# Patient Record
Sex: FEMALE | Race: WHITE | NOT HISPANIC OR LATINO | Employment: FULL TIME | URBAN - METROPOLITAN AREA
[De-identification: names, ages, dates, MRNs, and addresses within clinical notes are randomized per-mention and may not be internally consistent; named-entity substitution may affect disease eponyms.]

---

## 2018-09-14 ENCOUNTER — HOSPITAL ENCOUNTER (OUTPATIENT)
Dept: GENERAL RADIOLOGY | Facility: HOSPITAL | Age: 48
Discharge: HOME | End: 2018-09-14
Attending: FAMILY MEDICINE | Admitting: FAMILY MEDICINE

## 2018-09-15 ENCOUNTER — CONVERSION ENCOUNTER (OUTPATIENT)
Dept: FAMILY MEDICINE CLINIC | Facility: CLINIC | Age: 48
End: 2018-09-15

## 2018-09-16 LAB
CHOLEST SERPL-MCNC: 178 MG/DL
CHOLEST/HDLC SERPL: 3.6 (CALC)
FSH SERPL-ACNC: 5 MIU/ML
HBA1C MFR BLD: 6.2 %
HDLC SERPL-MCNC: 49 MG/DL
LDLC SERPL CALC-MCNC: 105 MG/DL
LH SERPL-ACNC: 4.2 MIU/ML
NONHDLC SERPL-MCNC: 129 MG/DL
PROLACTIN SERPL-MCNC: 17.3 NG/ML
TRIGL SERPL-MCNC: 143 MG/DL

## 2021-01-07 ENCOUNTER — OFFICE VISIT (OUTPATIENT)
Dept: FAMILY MEDICINE CLINIC | Facility: CLINIC | Age: 51
End: 2021-01-07

## 2021-01-07 VITALS
OXYGEN SATURATION: 95 % | TEMPERATURE: 97.3 F | DIASTOLIC BLOOD PRESSURE: 97 MMHG | SYSTOLIC BLOOD PRESSURE: 160 MMHG | BODY MASS INDEX: 39.03 KG/M2 | HEART RATE: 86 BPM | HEIGHT: 60 IN | WEIGHT: 198.8 LBS

## 2021-01-07 DIAGNOSIS — M25.511 CHRONIC RIGHT SHOULDER PAIN: ICD-10-CM

## 2021-01-07 DIAGNOSIS — Z23 NEED FOR INFLUENZA VACCINATION: ICD-10-CM

## 2021-01-07 DIAGNOSIS — R06.83 SNORING: ICD-10-CM

## 2021-01-07 DIAGNOSIS — Z13.1 SCREENING FOR DIABETES MELLITUS: ICD-10-CM

## 2021-01-07 DIAGNOSIS — Z11.59 NEED FOR HEPATITIS C SCREENING TEST: ICD-10-CM

## 2021-01-07 DIAGNOSIS — E66.01 MORBIDLY OBESE (HCC): ICD-10-CM

## 2021-01-07 DIAGNOSIS — G89.29 CHRONIC RIGHT SHOULDER PAIN: ICD-10-CM

## 2021-01-07 DIAGNOSIS — I10 UNCONTROLLED HYPERTENSION: Primary | ICD-10-CM

## 2021-01-07 DIAGNOSIS — Z13.220 SCREENING, LIPID: ICD-10-CM

## 2021-01-07 PROBLEM — Z88.9 ALLERGIC CONDITION: Status: ACTIVE | Noted: 2021-01-07

## 2021-01-07 PROCEDURE — 99214 OFFICE O/P EST MOD 30 MIN: CPT | Performed by: FAMILY MEDICINE

## 2021-01-07 RX ORDER — LABETALOL 100 MG/1
100 TABLET, FILM COATED ORAL
COMMUNITY
Start: 2020-09-14 | End: 2021-01-07 | Stop reason: SDUPTHER

## 2021-01-07 RX ORDER — METHOCARBAMOL 500 MG/1
TABLET, FILM COATED ORAL
Qty: 60 TABLET | Refills: 0 | Status: SHIPPED | OUTPATIENT
Start: 2021-01-07 | End: 2021-08-13

## 2021-01-07 RX ORDER — MELOXICAM 15 MG/1
15 TABLET ORAL DAILY
Qty: 30 TABLET | Refills: 5 | Status: SHIPPED | OUTPATIENT
Start: 2021-01-07 | End: 2021-08-13

## 2021-01-07 RX ORDER — LABETALOL 100 MG/1
100 TABLET, FILM COATED ORAL 2 TIMES DAILY
Qty: 60 TABLET | Refills: 5 | Status: SHIPPED | OUTPATIENT
Start: 2021-01-07 | End: 2021-08-13 | Stop reason: SDUPTHER

## 2021-01-07 NOTE — PROGRESS NOTES
Chief Complaint   Patient presents with   • Annual Exam     without PAP   • Hypertension   • Shoulder Injury     Patient made an appointment today for a physical exam, but she wants to come in at a later date for the pelvic/pap.  She has additional complaints today.    Hypertension  This is a chronic problem. The current episode started more than 1 year ago. The problem is uncontrolled. (None) Past treatments include beta blockers. Current antihypertension treatment includes nothing. Compliance problems: patient stopped taking her medications.  There is no history of kidney disease, CAD/MI, CVA or heart failure. no known history of PASTOR, renal disease or thyroid disease.   Shoulder Injury   The right shoulder is affected. Incident onset: 5-6 years ago (slipped and fell at work) The injury mechanism was repetitive motion (flaring while working at [employer]). The quality of the pain is described as aching. Pertinent negatives include no numbness (occasional from neck to hand) or tingling. She has tried NSAIDs, acetaminophen and heat for the symptoms. The treatment provided mild relief.     Wellness  Nancy Oliva is a 50 y.o. female here for her annual physical with me. Nancy is here for coordination of medical care, to discuss health maintenance, disease prevention as well as to followup on medical problems. Patient's last CPE was NA. Activity level is moderate. Exercises 0 per week. Appetite is good. Feels well with few complaints. Energy level is good. Sleeps well. Patient's last colonoscopy was N/A. She is advised to repeat in 5 years. Patient's last mammogram was Last year. Patient is doing routine self skin exam monthly. Patient is doing routine self-breast exams monthly.     The following portions of the patient's history were reviewed and updated as appropriate: allergies, current medications, past family history, past medical history, past social history, past surgical history and problem list.      Past  Medical History :  Active Ambulatory Problems     Diagnosis Date Noted   • Hypertension 2021   • Allergic condition 2021   • Snoring 2021   • Morbidly obese (CMS/HCC) 2021   • Chronic right shoulder pain 2021     Resolved Ambulatory Problems     Diagnosis Date Noted   • No Resolved Ambulatory Problems     No Additional Past Medical History       Medication List:    Current Outpatient Medications:   •  labetalol (NORMODYNE) 100 MG tablet, Take 100 mg by mouth. 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   • Sexual activity: Yes       Family History:  Family History   Problem Relation Age of Onset   • Heart disease Father    • Sleep apnea Sister    • Cancer Maternal Aunt    • Cancer Maternal Grandmother    • Diabetes Maternal Grandfather        Past Surgical History:  Past Surgical History:   Procedure Laterality Date   •  SECTION     • TUBAL ABDOMINAL LIGATION         Health Maintenance   Topic Date Due   • COLONOSCOPY  1970   • ANNUAL PHYSICAL  1975   • TDAP/TD VACCINES (1 - Tdap) 1991   • INFLUENZA VACCINE  2020   • HEPATITIS C SCREENING  2021   • PAP SMEAR  2021   • Pneumococcal Vaccine 0-64  Aged Out   • MENINGOCOCCAL VACCINE  Aged Out         Review Of Systems:  Review of Systems   Constitutional: Positive for fatigue. Negative for fever.   Respiratory:        Snores   Musculoskeletal:  "Positive for arthralgias (see HPI).   Neurological: Negative for tingling and numbness (occasional from neck to hand).         Physical Exam:  Vital Signs:  /97   Pulse 86   Temp 97.3 °F (36.3 °C) (Temporal)   Ht 152.4 cm (60\")   Wt 90.2 kg (198 lb 12.8 oz)   LMP 12/19/2020 (Approximate)   SpO2 95%   BMI 38.83 kg/m²     Physical Exam  Constitutional:       General: She is not in acute distress.     Appearance: Normal appearance. She is well-developed. She is obese. She is not ill-appearing.   HENT:      Head: Normocephalic and atraumatic.      Right Ear: External ear normal.      Left Ear: External ear normal.   Eyes:      General: No scleral icterus.        Right eye: No discharge.         Left eye: No discharge.      Conjunctiva/sclera: Conjunctivae normal.      Pupils: Pupils are equal, round, and reactive to light.   Neck:      Musculoskeletal: Normal range of motion and neck supple. No edema or neck rigidity.      Thyroid: No thyromegaly.      Vascular: No JVD.   Cardiovascular:      Rate and Rhythm: Normal rate and regular rhythm.      Heart sounds: Normal heart sounds. No murmur.   Pulmonary:      Effort: Pulmonary effort is normal.      Breath sounds: Normal breath sounds. No wheezing or rales.   Genitourinary:     Comments: Exam not performed - she will return later in the month for exam  Musculoskeletal:         General: Tenderness present. No deformity or signs of injury.      Right shoulder: She exhibits decreased range of motion and tenderness. She exhibits no effusion, no crepitus, no deformity and normal strength.      Right lower leg: No edema.      Left lower leg: No edema.   Skin:     General: Skin is warm.      Capillary Refill: Capillary refill takes less than 2 seconds.      Findings: No rash.   Neurological:      Mental Status: She is alert and oriented to person, place, and time.      Cranial Nerves: No cranial nerve deficit.      Motor: No abnormal muscle tone.      Coordination: " Coordination normal.   Psychiatric:         Mood and Affect: Mood normal.         Behavior: Behavior normal.         Thought Content: Thought content normal.         Judgment: Judgment normal.           Assessment and Plan:  Diagnoses and all orders for this visit:    1. Uncontrolled hypertension (Primary)  Assessment & Plan:  Hypertension is worsening.  Medication changes per orders.  Restart meds.  Blood pressure will be reassessed in 2 weeks at wellness visit.    Orders:  -     labetalol (NORMODYNE) 100 MG tablet; Take 1 tablet by mouth 2 (Two) Times a Day. Patient stopped this medication a few months ago  Dispense: 60 tablet; Refill: 5  -     Comprehensive Metabolic Panel  -     TSH  -     CBC & Differential    2. Snoring  -     Ambulatory Referral to Sleep Lab    3. Chronic right shoulder pain  -     Ambulatory Referral to Physical Therapy Evaluate and treat  -     methocarbamol (Robaxin) 500 MG tablet; 1-2 tablets at bedtime if needed  Dispense: 60 tablet; Refill: 0  -     meloxicam (MOBIC) 15 MG tablet; Take 1 tablet by mouth Daily.  Dispense: 30 tablet; Refill: 5    4. Screening for diabetes mellitus  -     Hemoglobin A1c    5. Screening, lipid  -     Lipid Panel    6. Need for hepatitis C screening test  -     Hepatitis C antibody    7. Morbidly obese (CMS/HCC)    She will return on 1/20/2021 for an annual exam, recheck of her shoulder and blood pressure and to discuss lab results.  Lab order given to patient for labs to be done prior to next visit.    I wore protective equipment throughout this patient encounter to include mask and eye protection. Hand hygiene was performed before donning protective equipment and after removal when leaving the room.  Patient also wore a mask.

## 2021-01-20 ENCOUNTER — TELEPHONE (OUTPATIENT)
Dept: FAMILY MEDICINE CLINIC | Facility: CLINIC | Age: 51
End: 2021-01-20

## 2021-01-25 ENCOUNTER — TELEPHONE (OUTPATIENT)
Dept: FAMILY MEDICINE CLINIC | Facility: CLINIC | Age: 51
End: 2021-01-25

## 2021-01-25 PROBLEM — M25.511 CHRONIC RIGHT SHOULDER PAIN: Status: ACTIVE | Noted: 2021-01-25

## 2021-01-25 PROBLEM — E66.01 MORBIDLY OBESE (HCC): Status: ACTIVE | Noted: 2021-01-25

## 2021-01-25 PROBLEM — G89.29 CHRONIC RIGHT SHOULDER PAIN: Status: ACTIVE | Noted: 2021-01-25

## 2021-01-25 PROCEDURE — 90686 IIV4 VACC NO PRSV 0.5 ML IM: CPT | Performed by: FAMILY MEDICINE

## 2021-01-25 PROCEDURE — 90471 IMMUNIZATION ADMIN: CPT | Performed by: FAMILY MEDICINE

## 2021-01-25 NOTE — TELEPHONE ENCOUNTER
----- Message from Elham Odell MD sent at 1/25/2021  6:00 AM EST -----  Regarding: F/U  Can you reach out to patient to see if she is still interested in proceeding with PT for her shoulder injury and a sleep study.  They have been trying to contact her and havr been unsuccessful.    If you are not able to get ahold of her, let me know and I'll send a letter.    She also needs to reschedule her annual exam.

## 2021-01-25 NOTE — TELEPHONE ENCOUNTER
LM advising pt [facility] has been trying to reach her to schedule sleep study and to reschedule annual exam with Dr. Odell.  Pt is scheduled at PT for 02/04/2021

## 2021-01-25 NOTE — ASSESSMENT & PLAN NOTE
Hypertension is worsening.  Medication changes per orders.  Restart meds.  Blood pressure will be reassessed in 2 weeks at wellness visit.

## 2021-01-29 ENCOUNTER — OFFICE VISIT (OUTPATIENT)
Dept: FAMILY MEDICINE CLINIC | Facility: CLINIC | Age: 51
End: 2021-01-29

## 2021-01-29 VITALS
HEART RATE: 84 BPM | WEIGHT: 198 LBS | SYSTOLIC BLOOD PRESSURE: 144 MMHG | DIASTOLIC BLOOD PRESSURE: 94 MMHG | HEIGHT: 60 IN | OXYGEN SATURATION: 98 % | BODY MASS INDEX: 38.87 KG/M2 | RESPIRATION RATE: 16 BRPM | TEMPERATURE: 97.3 F

## 2021-01-29 DIAGNOSIS — Z12.4 SCREENING FOR CERVICAL CANCER: ICD-10-CM

## 2021-01-29 DIAGNOSIS — I10 ESSENTIAL HYPERTENSION: ICD-10-CM

## 2021-01-29 DIAGNOSIS — Z00.00 ENCOUNTER FOR ANNUAL PHYSICAL EXAM: Primary | ICD-10-CM

## 2021-01-29 DIAGNOSIS — Z12.11 COLON CANCER SCREENING: ICD-10-CM

## 2021-01-29 DIAGNOSIS — E66.01 MORBIDLY OBESE (HCC): ICD-10-CM

## 2021-01-29 DIAGNOSIS — Z12.31 ENCOUNTER FOR SCREENING MAMMOGRAM FOR MALIGNANT NEOPLASM OF BREAST: ICD-10-CM

## 2021-01-29 DIAGNOSIS — E78.2 MIXED HYPERLIPIDEMIA: ICD-10-CM

## 2021-01-29 DIAGNOSIS — R01.1 HEART MURMUR: ICD-10-CM

## 2021-01-29 DIAGNOSIS — E74.39 GLUCOSE INTOLERANCE: ICD-10-CM

## 2021-01-29 LAB
BILIRUB BLD-MCNC: NEGATIVE MG/DL
CLARITY, POC: CLEAR
COLOR UR: YELLOW
GLUCOSE UR STRIP-MCNC: NEGATIVE MG/DL
KETONES UR QL: NEGATIVE
LEUKOCYTE EST, POC: NEGATIVE
NITRITE UR-MCNC: NEGATIVE MG/ML
PH UR: 6 [PH] (ref 5–8)
PROT UR STRIP-MCNC: ABNORMAL MG/DL
RBC # UR STRIP: ABNORMAL /UL
SP GR UR: 1.02 (ref 1–1.03)
UROBILINOGEN UR QL: NORMAL

## 2021-01-29 PROCEDURE — 99396 PREV VISIT EST AGE 40-64: CPT | Performed by: FAMILY MEDICINE

## 2021-01-29 PROCEDURE — 81003 URINALYSIS AUTO W/O SCOPE: CPT | Performed by: FAMILY MEDICINE

## 2021-01-30 LAB
ALBUMIN SERPL-MCNC: 4.1 G/DL (ref 3.8–4.8)
ALBUMIN/GLOB SERPL: 1.4 {RATIO} (ref 1.2–2.2)
ALP SERPL-CCNC: 109 IU/L (ref 39–117)
ALT SERPL-CCNC: 23 IU/L (ref 0–32)
AST SERPL-CCNC: 24 IU/L (ref 0–40)
BASOPHILS # BLD AUTO: 0 X10E3/UL (ref 0–0.2)
BASOPHILS NFR BLD AUTO: 1 %
BILIRUB SERPL-MCNC: <0.2 MG/DL (ref 0–1.2)
BUN SERPL-MCNC: 8 MG/DL (ref 6–24)
BUN/CREAT SERPL: 11 (ref 9–23)
CALCIUM SERPL-MCNC: 9.6 MG/DL (ref 8.7–10.2)
CHLORIDE SERPL-SCNC: 103 MMOL/L (ref 96–106)
CHOLEST SERPL-MCNC: 194 MG/DL (ref 100–199)
CO2 SERPL-SCNC: 23 MMOL/L (ref 20–29)
CREAT SERPL-MCNC: 0.72 MG/DL (ref 0.57–1)
EOSINOPHIL # BLD AUTO: 0.2 X10E3/UL (ref 0–0.4)
EOSINOPHIL NFR BLD AUTO: 2 %
ERYTHROCYTE [DISTWIDTH] IN BLOOD BY AUTOMATED COUNT: 14 % (ref 11.7–15.4)
GLOBULIN SER CALC-MCNC: 3 G/DL (ref 1.5–4.5)
GLUCOSE SERPL-MCNC: 108 MG/DL (ref 65–99)
HBA1C MFR BLD: 6.9 % (ref 4.8–5.6)
HCT VFR BLD AUTO: 37.2 % (ref 34–46.6)
HCV AB S/CO SERPL IA: <0.1 S/CO RATIO (ref 0–0.9)
HDLC SERPL-MCNC: 44 MG/DL
HGB BLD-MCNC: 11.9 G/DL (ref 11.1–15.9)
IMM GRANULOCYTES # BLD AUTO: 0 X10E3/UL (ref 0–0.1)
IMM GRANULOCYTES NFR BLD AUTO: 0 %
LDLC SERPL CALC-MCNC: 113 MG/DL (ref 0–99)
LYMPHOCYTES # BLD AUTO: 2.1 X10E3/UL (ref 0.7–3.1)
LYMPHOCYTES NFR BLD AUTO: 27 %
MCH RBC QN AUTO: 25.9 PG (ref 26.6–33)
MCHC RBC AUTO-ENTMCNC: 32 G/DL (ref 31.5–35.7)
MCV RBC AUTO: 81 FL (ref 79–97)
MONOCYTES # BLD AUTO: 0.5 X10E3/UL (ref 0.1–0.9)
MONOCYTES NFR BLD AUTO: 6 %
NEUTROPHILS # BLD AUTO: 5.2 X10E3/UL (ref 1.4–7)
NEUTROPHILS NFR BLD AUTO: 64 %
PLATELET # BLD AUTO: 278 X10E3/UL (ref 150–450)
POTASSIUM SERPL-SCNC: 4.4 MMOL/L (ref 3.5–5.2)
PROT SERPL-MCNC: 7.1 G/DL (ref 6–8.5)
RBC # BLD AUTO: 4.59 X10E6/UL (ref 3.77–5.28)
SODIUM SERPL-SCNC: 139 MMOL/L (ref 134–144)
TRIGL SERPL-MCNC: 211 MG/DL (ref 0–149)
TSH SERPL DL<=0.005 MIU/L-ACNC: 2.58 UIU/ML (ref 0.45–4.5)
VLDLC SERPL CALC-MCNC: 37 MG/DL (ref 5–40)
WBC # BLD AUTO: 8 X10E3/UL (ref 3.4–10.8)

## 2021-02-02 DIAGNOSIS — E74.39 GLUCOSE INTOLERANCE: ICD-10-CM

## 2021-02-02 DIAGNOSIS — E78.2 MIXED HYPERLIPIDEMIA: Primary | ICD-10-CM

## 2021-02-02 PROBLEM — E11.9 CONTROLLED TYPE 2 DIABETES MELLITUS WITHOUT COMPLICATION, WITHOUT LONG-TERM CURRENT USE OF INSULIN (HCC): Status: ACTIVE | Noted: 2021-02-02

## 2021-02-02 PROBLEM — E11.9 CONTROLLED TYPE 2 DIABETES MELLITUS WITHOUT COMPLICATION, WITHOUT LONG-TERM CURRENT USE OF INSULIN (HCC): Status: RESOLVED | Noted: 2021-02-02 | Resolved: 2021-02-02

## 2021-02-02 RX ORDER — PRAVASTATIN SODIUM 20 MG
20 TABLET ORAL DAILY
Qty: 30 TABLET | Refills: 5 | Status: SHIPPED | OUTPATIENT
Start: 2021-02-02 | End: 2021-08-13 | Stop reason: SDUPTHER

## 2021-02-02 RX ORDER — METFORMIN HYDROCHLORIDE 500 MG/1
500 TABLET, EXTENDED RELEASE ORAL 2 TIMES DAILY WITH MEALS
Qty: 60 TABLET | Refills: 5 | Status: SHIPPED | OUTPATIENT
Start: 2021-02-02 | End: 2021-08-13 | Stop reason: SDUPTHER

## 2021-02-04 ENCOUNTER — TELEPHONE (OUTPATIENT)
Dept: FAMILY MEDICINE CLINIC | Facility: CLINIC | Age: 51
End: 2021-02-04

## 2021-02-04 ENCOUNTER — APPOINTMENT (OUTPATIENT)
Dept: CARDIOLOGY | Facility: HOSPITAL | Age: 51
End: 2021-02-04

## 2021-02-04 ENCOUNTER — TREATMENT (OUTPATIENT)
Dept: PHYSICAL THERAPY | Facility: CLINIC | Age: 51
End: 2021-02-04

## 2021-02-04 DIAGNOSIS — G89.29 CHRONIC RIGHT SHOULDER PAIN: Primary | ICD-10-CM

## 2021-02-04 DIAGNOSIS — M25.511 CHRONIC RIGHT SHOULDER PAIN: Primary | ICD-10-CM

## 2021-02-04 DIAGNOSIS — R29.3 POOR POSTURE: ICD-10-CM

## 2021-02-04 LAB
CYTOLOGIST CVX/VAG CYTO: NORMAL
CYTOLOGY CVX/VAG DOC CYTO: NORMAL
DX ICD CODE: NORMAL
HIV 1 & 2 AB SER-IMP: NORMAL
OTHER STN SPEC: NORMAL
STAT OF ADQ CVX/VAG CYTO-IMP: NORMAL

## 2021-02-04 PROCEDURE — 97162 PT EVAL MOD COMPLEX 30 MIN: CPT | Performed by: PHYSICAL THERAPIST

## 2021-02-04 PROCEDURE — 97110 THERAPEUTIC EXERCISES: CPT | Performed by: PHYSICAL THERAPIST

## 2021-02-04 PROCEDURE — 97014 ELECTRIC STIMULATION THERAPY: CPT | Performed by: PHYSICAL THERAPIST

## 2021-02-04 NOTE — PROGRESS NOTES
Physical Therapy Initial Evaluation and Plan of Care    Patient: Nancy Oliva   : 1970  Diagnosis/ICD-10 Code:  Chronic right shoulder pain [M25.511, G89.29]  Referring practitioner: Elham Adames*  Date of Initial Visit: 2021  Today's Date: 2021  Patient seen for 1 sessions           Subjective Questionnaire: QuickDASH: 39%      Subjective Evaluation    History of Present Illness  Mechanism of injury: Patient is a 50 year old female with complaints of R shoulder pain.  She reports falling onto her R shoulder 5 years ago and has experienced intermittent pain since.  In September, she started working at [employer] and began experiencing anterior shoulder pain, R upper trap and medial scapular pain.  Patient reports her shoulder is okay the first work day of the week but then is very sore the remainder of the work week.  Patient reports difficulty with work duties, numbness in L hand since she has been relying on her LUE for work duties, and difficulty sleeping.        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  Current pain ratin  At worst pain ratin  Location: R upper trap, medial scapula  Quality: dull ache (sore)  Relieving factors: rest and medications  Aggravating factors: lifting, outstretched reach, sleeping and prolonged positioning    Social Support  Lives with: adult children    Hand dominance: right    Patient Goals  Patient goals for therapy: increased strength, decreased pain, increased motion and independence with ADLs/IADLs             Objective          Postural Observations    Additional Postural Observation Details  R shoulder depressed, B scapulae protracted; forward head, increased thoracic kyphosis; Dowager's hump    Palpation     Right   No palpable tenderness to the anterior deltoid, biceps, lower trapezius, middle deltoid and middle trapezius. Tenderness of the infraspinatus, levator scapulae, supraspinatus and upper trapezius.     Tenderness      Right Shoulder  Tenderness in the infraspinatus tendon and supraspinatus tendon. No tenderness in the bicipital groove.     Cervical/Thoracic Screen   Cervical range of motion within normal limits with the following exceptions: Cervical ROM WFL, non-provocative    Active Range of Motion   Left Shoulder   Flexion: WFL  Abduction: WFL  External rotation BTH: WFL  Internal rotation BTB: WFL    Right Shoulder   Flexion: WFL and with pain  Abduction: WFL and with pain  External rotation BTH: WFL and with pain  Internal rotation BTB: WFL and with pain    Strength/Myotome Testing     Left Shoulder     Planes of Motion   Flexion: 4+   Abduction: 4+   External rotation at 0°: 4+   Internal rotation at 0°: 5     Right Shoulder     Planes of Motion   Flexion: 4   Abduction: 4   External rotation at 0°: 4-   Internal rotation at 0°: 5     Isolated Muscles   Infraspinatus: 4-     Tests     Left Shoulder   Negative belly press, crank, empty can, full can, Hawkin's, lift-off and painful arc.     Right Shoulder   Positive empty can.   Negative belly press, crank, full can, Hawkin's, lift-off and painful arc.           Assessment & Plan     Assessment  Impairments: abnormal or restricted ROM, activity intolerance, impaired physical strength, lacks appropriate home exercise program and pain with function  Assessment details: Patient is a 50 year old female with complaints of R shoulder pain.  Patient presents with painful shoulder ROM, weakness of supraspinatus and infraspinatus, tenderness of upper trap, medial scapula, and rotation cuff musculature, and difficulty sleeping and performing work duties.  Patient presents with the impairments listed above and based on the objective findings and the physical therapy evaluation, the patient's condition has the potential to improve in response to therapy.   The patient's condition and/or services required are at a level of complexity that necessitates the skill & supervision of a  physical therapist.    Prognosis: good  Functional Limitations: carrying objects, lifting, sleeping, uncomfortable because of pain, reaching behind back and reaching overhead  Goals  Plan Goals: STG:  -Patient will report a reduction in R shoulder/upper trap pain by >/=25% for improved tolerance to ADLs in 2 weeks.  -Patient will require less than 3 cues for appropriate performance of HEP in 2 weeks.   -Patient will demonstrate improved posture with no verbal cueing in 2 weeks.    LTG:  -Patient will demonstrate shoulder AROM to WFL without complaints of pain by discharge.  -Patient will demonstrate increased strength of R shoulder to grossly 4+/5 by discharge.  -Patient will be independent with HEP by discharge.  -Patient will report min to no R shoulder pain for return to PLOF by discharge.    Plan  Therapy options: will be seen for skilled physical therapy services  Planned modality interventions: cryotherapy, thermotherapy (hydrocollator packs), electrical stimulation/Thai stimulation, ultrasound and dry needling  Planned therapy interventions: manual therapy, postural training, soft tissue mobilization, spinal/joint mobilization, strengthening, stretching, therapeutic activities, joint mobilization, home exercise program, functional ROM exercises, flexibility and body mechanics training  Frequency: 2x week  Duration in visits: 16  Treatment plan discussed with: patient        Timed:         Manual Therapy:         mins  64509;     Therapeutic Exercise:    14     mins  39777;     Neuromuscular Mirta:        mins  05715;    Therapeutic Activity:          mins  60289;     Gait Training:           mins  37701;     Ultrasound:          mins  75455;    Ionto                                   mins   52220  Self-care  ____ mins 67014    Un-Timed:  Electrical Stimulation:    15     mins  13274 ( );  Dry Needling          mins self-pay  Traction          mins 47520  Low Eval          Mins  82496  Mod Eval     27      Mins  74939  High Eval                            Mins  88867        Timed Treatment:   14   mins   Total Treatment:     56   mins    PT SIGNATURE: Faviola Machado PT   [place] License # [id number]  Physical Therapist  DATE TREATMENT INITIATED: 2/4/2021    Initial Certification  Certification Period: 5/5/2021  I certify that the therapy services are furnished while this patient is under my care.  The services outlined above are required by this patient, and will be reviewed every 90 days.     PHYSICIAN: Elham Odell MD      DATE:     Please sign and return via fax to [phone].. Thank you, [facility].

## 2021-02-04 NOTE — TELEPHONE ENCOUNTER
Called and spoke to Nancy regarding pap smear results. Also discussed lab results. She expressed understanding about new medications and stated she would pick these up tomorrow. She is scheduled for 04/30/2021 for a follow up.  Letter was mailed to patient with appt information per patient request.        ----- Message from Elham Odell MD sent at 2/4/2021  8:29 AM EST -----  Can you please let patient know that her pap smear is negative.   Her next will be due in 3 years.      Can you please let patient know test results:     Her lab work is showing elevated blood sugar readings consistent with diabetes and high cholesterol.  I would like to start her on metformin to help regulate her blood sugar readings.  I will send in metformin  mg twice daily to take with meals.  Have her watch for GI discomfort and diarrhea.     I would also like her to start a low dosage statin for her cholesterol.  I will send in pravastatin 20 mg daily.  I recommend a f/u in the office in 3 mo to recheck her a1c and cholesterol.  Meds sent to pharmacy on file.     The rest of her labs look good.  I will also type up a letter that we can drop in the mail to her so that she can review the numbers herself.

## 2021-02-04 NOTE — TELEPHONE ENCOUNTER
----- Message from Elham Odell MD sent at 2/4/2021  8:29 AM EST -----  Can you please let patient know that her pap smear is negative.   Her next will be due in 3 years.

## 2021-02-05 ENCOUNTER — HOSPITAL ENCOUNTER (OUTPATIENT)
Dept: CARDIOLOGY | Facility: HOSPITAL | Age: 51
Discharge: HOME | End: 2021-02-05
Admitting: FAMILY MEDICINE

## 2021-02-05 VITALS
HEIGHT: 60 IN | DIASTOLIC BLOOD PRESSURE: 96 MMHG | BODY MASS INDEX: 38.87 KG/M2 | WEIGHT: 197.97 LBS | SYSTOLIC BLOOD PRESSURE: 208 MMHG

## 2021-02-05 DIAGNOSIS — R01.1 HEART MURMUR: ICD-10-CM

## 2021-02-05 PROCEDURE — 93306 TTE W/DOPPLER COMPLETE: CPT

## 2021-02-05 PROCEDURE — 93306 TTE W/DOPPLER COMPLETE: CPT | Performed by: INTERNAL MEDICINE

## 2021-02-09 LAB
BH CV ECHO MEAS - ACS: 1.8 CM
BH CV ECHO MEAS - AI DEC SLOPE: 231.8 CM/SEC^2
BH CV ECHO MEAS - AI DEC TIME: 1.9 SEC
BH CV ECHO MEAS - AI MAX PG: 69.4 MMHG
BH CV ECHO MEAS - AI MAX VEL: 416.6 CM/SEC
BH CV ECHO MEAS - AI P1/2T: 526.3 MSEC
BH CV ECHO MEAS - AO MAX PG (FULL): 9.6 MMHG
BH CV ECHO MEAS - AO MAX PG: 17.7 MMHG
BH CV ECHO MEAS - AO MEAN PG (FULL): 3.3 MMHG
BH CV ECHO MEAS - AO MEAN PG: 7.2 MMHG
BH CV ECHO MEAS - AO ROOT AREA (BSA CORRECTED): 1.6
BH CV ECHO MEAS - AO ROOT AREA: 6.8 CM^2
BH CV ECHO MEAS - AO ROOT DIAM: 2.9 CM
BH CV ECHO MEAS - AO V2 MAX: 210.3 CM/SEC
BH CV ECHO MEAS - AO V2 MEAN: 123 CM/SEC
BH CV ECHO MEAS - AO V2 VTI: 41.8 CM
BH CV ECHO MEAS - ASC AORTA: 2.7 CM
BH CV ECHO MEAS - AVA(I,A): 2.5 CM^2
BH CV ECHO MEAS - AVA(I,D): 2.5 CM^2
BH CV ECHO MEAS - AVA(V,A): 2.1 CM^2
BH CV ECHO MEAS - AVA(V,D): 2.1 CM^2
BH CV ECHO MEAS - BSA(HAYCOCK): 2 M^2
BH CV ECHO MEAS - BSA: 1.9 M^2
BH CV ECHO MEAS - BZI_BMI: 38.7 KILOGRAMS/M^2
BH CV ECHO MEAS - BZI_METRIC_HEIGHT: 152.4 CM
BH CV ECHO MEAS - BZI_METRIC_WEIGHT: 89.8 KG
BH CV ECHO MEAS - EDV(CUBED): 145.9 ML
BH CV ECHO MEAS - EDV(MOD-SP4): 100.5 ML
BH CV ECHO MEAS - EDV(TEICH): 133.2 ML
BH CV ECHO MEAS - EF(CUBED): 72.1 %
BH CV ECHO MEAS - EF(MOD-BP): 65 %
BH CV ECHO MEAS - EF(MOD-SP4): 67.9 %
BH CV ECHO MEAS - EF(TEICH): 63.4 %
BH CV ECHO MEAS - ESV(CUBED): 40.6 ML
BH CV ECHO MEAS - ESV(MOD-SP4): 32.3 ML
BH CV ECHO MEAS - ESV(TEICH): 48.7 ML
BH CV ECHO MEAS - FS: 34.7 %
BH CV ECHO MEAS - IVS/LVPW: 1.1
BH CV ECHO MEAS - IVSD: 1.1 CM
BH CV ECHO MEAS - LA DIMENSION: 3.2 CM
BH CV ECHO MEAS - LA/AO: 1.1
BH CV ECHO MEAS - LV DIASTOLIC VOL/BSA (35-75): 54.1 ML/M^2
BH CV ECHO MEAS - LV MASS(C)D: 224.1 GRAMS
BH CV ECHO MEAS - LV MASS(C)DI: 120.5 GRAMS/M^2
BH CV ECHO MEAS - LV MAX PG: 8.1 MMHG
BH CV ECHO MEAS - LV MEAN PG: 4 MMHG
BH CV ECHO MEAS - LV SYSTOLIC VOL/BSA (12-30): 17.4 ML/M^2
BH CV ECHO MEAS - LV V1 MAX: 142.3 CM/SEC
BH CV ECHO MEAS - LV V1 MEAN: 92.6 CM/SEC
BH CV ECHO MEAS - LV V1 VTI: 33.8 CM
BH CV ECHO MEAS - LVIDD: 5.3 CM
BH CV ECHO MEAS - LVIDS: 3.4 CM
BH CV ECHO MEAS - LVOT AREA: 3.1 CM^2
BH CV ECHO MEAS - LVOT DIAM: 2 CM
BH CV ECHO MEAS - LVPWD: 1.1 CM
BH CV ECHO MEAS - MR MAX PG: 124.4 MMHG
BH CV ECHO MEAS - MR MAX VEL: 557.4 CM/SEC
BH CV ECHO MEAS - MV A MAX VEL: 101.7 CM/SEC
BH CV ECHO MEAS - MV DEC SLOPE: 464.7 CM/SEC^2
BH CV ECHO MEAS - MV DEC TIME: 0.26 SEC
BH CV ECHO MEAS - MV E MAX VEL: 123.1 CM/SEC
BH CV ECHO MEAS - MV E/A: 1.2
BH CV ECHO MEAS - MV MAX PG: 6.7 MMHG
BH CV ECHO MEAS - MV MEAN PG: 2.6 MMHG
BH CV ECHO MEAS - MV V2 MAX: 129.1 CM/SEC
BH CV ECHO MEAS - MV V2 MEAN: 74.4 CM/SEC
BH CV ECHO MEAS - MV V2 VTI: 43.5 CM
BH CV ECHO MEAS - MVA(VTI): 2.4 CM^2
BH CV ECHO MEAS - PA ACC TIME: 0.09 SEC
BH CV ECHO MEAS - PA MAX PG: 10.7 MMHG
BH CV ECHO MEAS - PA PR(ACCEL): 36.6 MMHG
BH CV ECHO MEAS - PA V2 MAX: 163.6 CM/SEC
BH CV ECHO MEAS - PI END-D VEL: 201 CM/SEC
BH CV ECHO MEAS - PULM A REVS DUR: 0.13 SEC
BH CV ECHO MEAS - PULM A REVS VEL: 36.5 CM/SEC
BH CV ECHO MEAS - PULM DIAS VEL: 43 CM/SEC
BH CV ECHO MEAS - PULM S/D: 1.8
BH CV ECHO MEAS - PULM SYS VEL: 76.7 CM/SEC
BH CV ECHO MEAS - RAP SYSTOLE: 10 MMHG
BH CV ECHO MEAS - RVSP: 56.5 MMHG
BH CV ECHO MEAS - SI(AO): 153.2 ML/M^2
BH CV ECHO MEAS - SI(CUBED): 56.6 ML/M^2
BH CV ECHO MEAS - SI(LVOT): 55.8 ML/M^2
BH CV ECHO MEAS - SI(MOD-SP4): 36.7 ML/M^2
BH CV ECHO MEAS - SI(TEICH): 45.5 ML/M^2
BH CV ECHO MEAS - SV(AO): 284.8 ML
BH CV ECHO MEAS - SV(CUBED): 105.2 ML
BH CV ECHO MEAS - SV(LVOT): 103.7 ML
BH CV ECHO MEAS - SV(MOD-SP4): 68.2 ML
BH CV ECHO MEAS - SV(TEICH): 84.5 ML
BH CV ECHO MEAS - TR MAX VEL: 324.3 CM/SEC
BH CV XLRA - RV BASE: 3.2 CM
BH CV XLRA - RV MID: 2.4 CM

## 2021-02-10 NOTE — PROGRESS NOTES
Please let patient know the results of her echo.    1) heart function is normal.     2) She has one leaky valve that does not require any treatment at this time.      3) The heart muscle of the left ventricle (that pumps blood to the body) has thickened.  So, it means the heart is having to pump harder.  She needs to achieve very good blood pressure control.  I recommend she closely watch her BP.  Keep readings <135/85.  Watch diet (recommend DASH diet), exercise and lose weight.  And, follow-through with the sleep apnea evaluation, to make sure that is not a factor in these changes.

## 2021-02-17 ENCOUNTER — TELEPHONE (OUTPATIENT)
Dept: FAMILY MEDICINE CLINIC | Facility: CLINIC | Age: 51
End: 2021-02-17

## 2021-02-17 NOTE — TELEPHONE ENCOUNTER
----- Message from Elham Odell MD sent at 2/10/2021 10:00 AM EST -----  Please let patient know the results of her echo.    1) heart function is normal.     2) She has one leaky valve that does not require any treatment at this time.      3) The heart muscle of the left ventricle (that pumps blood to the body) has thickened.  So, it means the heart is having to pump harder.  She needs to achieve very good blood pressure control.  I recommend she closely watch her BP.  Keep readings <1  35/85.  Watch diet (recommend DASH diet), exercise and lose weight.  And, follow-through with the sleep apnea evaluation, to make sure that is not a factor in these changes.

## 2021-02-17 NOTE — TELEPHONE ENCOUNTER
Spoke to pt 02/17/2021, 5:25pm advised pt of ECHO results, monitor BP, exercise and lose weight per Dr. Odell.      Pt states Metformin has been giving her diarrhea on a daily basis, can she take another medication?

## 2021-02-18 NOTE — TELEPHONE ENCOUNTER
Have her try OTC Berberine 500 mg twice daily.  It is a herbal treatment that has shown to about as effective as metformin.  Do this along with dietary reduction of carbs.

## 2021-02-19 ENCOUNTER — TREATMENT (OUTPATIENT)
Dept: PHYSICAL THERAPY | Facility: CLINIC | Age: 51
End: 2021-02-19

## 2021-02-19 DIAGNOSIS — G89.29 CHRONIC RIGHT SHOULDER PAIN: Primary | ICD-10-CM

## 2021-02-19 DIAGNOSIS — M25.511 CHRONIC RIGHT SHOULDER PAIN: Primary | ICD-10-CM

## 2021-02-19 DIAGNOSIS — R29.3 POOR POSTURE: ICD-10-CM

## 2021-02-19 PROCEDURE — 97014 ELECTRIC STIMULATION THERAPY: CPT | Performed by: PHYSICAL THERAPIST

## 2021-02-19 PROCEDURE — 97530 THERAPEUTIC ACTIVITIES: CPT | Performed by: PHYSICAL THERAPIST

## 2021-02-19 PROCEDURE — 97110 THERAPEUTIC EXERCISES: CPT | Performed by: PHYSICAL THERAPIST

## 2021-02-19 NOTE — PROGRESS NOTES
Physical Therapy Daily Progress Note      Patient: Nancy Oliva   : 1970  Diagnosis/ICD-10 Code:  Chronic right shoulder pain [M25.511, G89.29]   Problems Addressed this Visit        Musculoskeletal and Injuries    Chronic right shoulder pain - Primary      Other Visit Diagnoses     Poor posture          Diagnoses       Codes Comments    Chronic right shoulder pain    -  Primary ICD-10-CM: M25.511, G89.29  ICD-9-CM: 719.41, 338.29     Poor posture     ICD-10-CM: R29.3  ICD-9-CM: 781.92          Referring practitioner: Elham Adames*  Date of Initial Visit: Type: THERAPY  Noted: 2021  Today's Date: 2021    VISIT#: 2    Subjective Patient arrived 27 minutes late to therapy due to her son's appointment running long.  Patient reports she has not worked since her evaluation due to [employer] being shut down due to weather.  States her pain and numbness has improved and is experiencing no pain today.      Objective     See Exercise, Manual, and Modality Logs for complete treatment.     Assessment/Plan Progressed strengthening exercises for improved posture and decreased pain with work duties.  Patient continues to require frequent cueing for slow controlled motion and for improved posture.  Patient able to correct but only maintains good posture for 1-2 minutes at a time.  Continue to progress strengthening and thoracic mobility as tolerated.    Progress per Plan of Care and Progress strengthening /stabilization /functional activity         Timed:         Manual Therapy:         mins  26083;     Therapeutic Exercise:    15     mins  22494;     Neuromuscular Mirta:        mins  57405;    Therapeutic Activity:     18     mins  54081;     Gait Training:           mins  94420;     Ultrasound:          mins  62366;    Ionto                                   mins   84343  Self Care                            mins   17122    Un-Timed:  Electrical Stimulation:    15     mins  58450 (  );  Dry Needling          mins self-pay  Traction          mins 05511  Low Eval          Mins  90752  Mod Eval          Mins  05393  High Eval                            Mins  14828  Re-Eval                               mins  95533    Timed Treatment:   33   mins   Total Treatment:     48   mins    Faviola Machado, PT  Physical Therapist

## 2021-03-18 ENCOUNTER — APPOINTMENT (OUTPATIENT)
Dept: MAMMOGRAPHY | Facility: HOSPITAL | Age: 51
End: 2021-03-18

## 2021-03-19 ENCOUNTER — APPOINTMENT (OUTPATIENT)
Dept: MAMMOGRAPHY | Facility: HOSPITAL | Age: 51
End: 2021-03-19

## 2021-04-06 ENCOUNTER — DOCUMENTATION (OUTPATIENT)
Dept: PHYSICAL THERAPY | Facility: CLINIC | Age: 51
End: 2021-04-06

## 2021-04-06 NOTE — PROGRESS NOTES
Discharge Summary  Discharge Summary from Physical Therapy Report      Dates  PT visit: 2/4 - 2/19  Number of Visits: 2     Discharge Status of Patient: See MD Note dated 2/19    Goals: Not Met    Discharge Plan: Future need for rehabilitation activities    Comments Patient attended 2 PT sessions including initial evaluation but no call/no showed for remainder of her appointments. Did not return phone calls to schedule additional visits.    Date of Discharge 4/6/21        Faviola Machado, PT  Physical Therapist

## 2021-06-24 ENCOUNTER — TELEPHONE (OUTPATIENT)
Dept: FAMILY MEDICINE CLINIC | Facility: CLINIC | Age: 51
End: 2021-06-24

## 2021-06-24 NOTE — TELEPHONE ENCOUNTER
Caller: Nancy Oliva    Relationship to patient: Self    Best call back number: [phone]     Chief complaint: PATIENT RECEIVED A DOG BITE BETWEEN HER KNEE AND HER ANKLE. THE BITCE CAME FROM HER OWN DOG AND SHE NOW THINKS THE WOUND MAY BE INFECTED. THERE IS A RASH AND BRUISING AROUND THE BITE. THE PATIENT ALSO NEEDS MED REFILLS.     Type of visit: SAME DAY OR OFFICE VISIT    Requested date: TOMORROW, 06/25/2021 AFTER 12 P.M.    If rescheduling, when is the original appointment: N/A - PATIENT JUST CALLED IN AND THERE ARE NO AVAILABILITY DURING THE TIME SHE REQUESTED.    Additional notes: PLEASE ADVISE AT THE NUMBER ABOVE ASAP. THE PATIENT WOULD LIKE TO HAVE DIRECTION ON WHAT TO DO NEXT.

## 2021-08-10 ENCOUNTER — TELEPHONE (OUTPATIENT)
Dept: FAMILY MEDICINE CLINIC | Facility: CLINIC | Age: 51
End: 2021-08-10

## 2021-08-10 NOTE — TELEPHONE ENCOUNTER
Caller: Nancy Oliva    Relationship: Self    Best call back number: [phone]     What is the best time to reach you: ANY TIME     Who are you requesting to speak with (clinical staff, provider,  specific staff member): CLINICAL STAFF     What was the call regarding: PATIENT WAS FORCED TO QUARANTINE DUE TO BEING EXPOSED TO A POSITIVE COVID CASE (HER DAUGHTER), THEY ENDED THAT FIRST 14 DAY QUARANTINE DATE ON 07/25/21- ON THE 07/19/21, PATIENT TESTED POSITIVE FOR COVID.     AT THE END OF THAT FIRST QUARANTINE ON THE 25 TH, THE HEALTH DEPARTMENT REQUIRED HER START ANOTHER 10 DAY QUARANTINE. SO 07/25/21-08/02/21. HER EMPLOYER, [employer] REQUIRES A FULL 14 DAYS, SO THERE ARE 6 DAYS THAT ARE UNCOUNTED FOR, WHERE SHE'S MISSING PAY AND DEALING WITH SCHEDULE CHANGES CAUSE ITS NOT EXCUSED.    PATIENT IS ASKING FOR A WORK EXCUSE FROM THE DATES OF 08/02/21-08/07/21 DUE TO QUARANTINING  (PATIENT WENT BACK TO WORK ON 08/08/21)    PLEASE CALL PATIENT REGARDING ALL OF THIS. HER LAST OV WAS 01/29/21 FOR A YEARLY PHYSICAL AND HAS NOT BEEN SEEN SINCE.                 IF WE END UP SIGNING AND APPROVING A WORK EXCUSE WE CAN EMAIL THIS TO HER AND SHE CAN GET IT TO HER EMPLOYER HERSELF.         [email or web address]    Do you require a callback: YES PLEASE

## 2021-08-13 ENCOUNTER — OFFICE VISIT (OUTPATIENT)
Dept: FAMILY MEDICINE CLINIC | Facility: CLINIC | Age: 51
End: 2021-08-13

## 2021-08-13 VITALS — WEIGHT: 212.4 LBS | BODY MASS INDEX: 41.48 KG/M2

## 2021-08-13 DIAGNOSIS — U07.1 COVID-19 VIRUS INFECTION: Primary | ICD-10-CM

## 2021-08-13 DIAGNOSIS — E74.39 GLUCOSE INTOLERANCE: ICD-10-CM

## 2021-08-13 DIAGNOSIS — E78.2 MIXED HYPERLIPIDEMIA: ICD-10-CM

## 2021-08-13 DIAGNOSIS — I10 UNCONTROLLED HYPERTENSION: ICD-10-CM

## 2021-08-13 PROCEDURE — 99442 PR PHYS/QHP TELEPHONE EVALUATION 11-20 MIN: CPT | Performed by: FAMILY MEDICINE

## 2021-08-13 RX ORDER — LABETALOL 100 MG/1
100 TABLET, FILM COATED ORAL DAILY
Qty: 30 TABLET | Refills: 5 | Status: SHIPPED | OUTPATIENT
Start: 2021-08-13

## 2021-08-13 RX ORDER — METFORMIN HYDROCHLORIDE 500 MG/1
500 TABLET, EXTENDED RELEASE ORAL 2 TIMES DAILY WITH MEALS
Qty: 60 TABLET | Refills: 5 | Status: SHIPPED | OUTPATIENT
Start: 2021-08-13

## 2021-08-13 RX ORDER — PRAVASTATIN SODIUM 20 MG
20 TABLET ORAL DAILY
Qty: 30 TABLET | Refills: 5 | Status: SHIPPED | OUTPATIENT
Start: 2021-08-13

## 2021-08-13 NOTE — PROGRESS NOTES
Chief Complaint   Patient presents with   • Covid-19 Home Monitoring Phone Call       Subjective   Nancy Oliva is a 51 y.o. female.     You have chosen to receive care through a telephone visit. Do you consent to use a telephone visit for your medical care today? Yes    She was dx with COVID on 7/19/21 and was in isolation until 8/7/21.  She had to be out 14 days for exposure to her daughter, then had to isolate due to her testing positive.  She denies residual symptoms of COVID (just some fatigue, but works long 10 hr shifts).  She returned to work in 8/8 with no restrictions.  She is needing a work note today to cover the time she was off on isolation.    Patient had covid exposure from daughter,then patient tested positive on 07/19/2021. Employer requesting note from 07/25/2021-08/7/2021.      I have reviewed relevant past medical, family, social and surgical history for this patient.  Medications review is done by myself, with patient.    Past Medical History :  Active Ambulatory Problems     Diagnosis Date Noted   • Hypertension 01/07/2021   • Allergic condition 01/07/2021   • Snoring 01/07/2021   • Morbidly obese (CMS/HCC) 01/25/2021   • Chronic right shoulder pain 01/25/2021   • Mixed hyperlipidemia 02/02/2021   • Glucose intolerance 02/02/2021   • COVID-19 virus infection 08/29/2021     Resolved Ambulatory Problems     Diagnosis Date Noted   • No Resolved Ambulatory Problems     No Additional Past Medical History       Medication List:    Current Outpatient Medications:   •  labetalol (NORMODYNE) 100 MG tablet, Take 1 tablet by mouth 2 (Two) Times a Day. Patient stopped this medication a few months ago, Disp: 60 tablet, Rfl: 5  •  metFORMIN ER (GLUCOPHAGE-XR) 500 MG 24 hr tablet, Take 1 tablet by mouth 2 (Two) Times a Day With Meals., Disp: 60 tablet, Rfl: 5  •  pravastatin (PRAVACHOL) 20 MG tablet, Take 1 tablet by mouth Daily., Disp: 30 tablet, Rfl: 5    No Known Allergies    Social History      Tobacco Use   • Smoking status: Never Smoker   • Smokeless tobacco: Never Used   Substance Use Topics   • Alcohol use: Not Currently     Comment: very infrequent       Review of Systems   Constitutional: Positive for fatigue.   HENT: Negative for sore throat and trouble swallowing.    Respiratory: Negative for cough, chest tightness, shortness of breath and wheezing.    Cardiovascular: Negative for chest pain.   Neurological: Negative for dizziness and syncope.       Objective   Vitals:    08/13/21 1059   Weight: 96.3 kg (212 lb 6.4 oz)     Body mass index is 41.48 kg/m².    Physical Exam  Pulmonary:      Effort: No respiratory distress.   Neurological:      Mental Status: She is alert and oriented to person, place, and time.         Assessment/Plan     Diagnoses and all orders for this visit:    1. COVID-19 virus infection (Primary)  Comments:  Recovered.  All questions answered.    2. Uncontrolled hypertension  Comments:  She adjusted dosage to 100 mg daily and reports this dosage is working well for her.  Refills given.  Orders:  -     labetalol (NORMODYNE) 100 MG tablet; Take 1 tablet by mouth Daily.  Dispense: 30 tablet; Refill: 5    3. Glucose intolerance  Comments:  Refills given.   Labs current (1/2021).  Orders:  -     metFORMIN ER (GLUCOPHAGE-XR) 500 MG 24 hr tablet; Take 1 tablet by mouth 2 (Two) Times a Day With Meals.  Dispense: 60 tablet; Refill: 5    4. Mixed hyperlipidemia  Comments:  Refills given.  Labs current (1/2021).  Orders:  -     pravastatin (PRAVACHOL) 20 MG tablet; Take 1 tablet by mouth Daily.  Dispense: 30 tablet; Refill: 5      Work note given.    Return if symptoms worsen or fail to improve.       Length of telephone visit: 13 minutes

## 2021-08-29 PROBLEM — U07.1 COVID-19 VIRUS INFECTION: Status: ACTIVE | Noted: 2021-08-29

## 2025-08-06 ENCOUNTER — TRANSCRIBE ORDERS (OUTPATIENT)
Dept: PHYSICAL THERAPY | Facility: CLINIC | Age: 55
End: 2025-08-06
Payer: COMMERCIAL

## 2025-08-06 DIAGNOSIS — R07.9 CHEST PAIN, UNSPECIFIED TYPE: Primary | ICD-10-CM
